# Patient Record
Sex: MALE | Race: WHITE | NOT HISPANIC OR LATINO
[De-identification: names, ages, dates, MRNs, and addresses within clinical notes are randomized per-mention and may not be internally consistent; named-entity substitution may affect disease eponyms.]

---

## 2017-01-23 ENCOUNTER — APPOINTMENT (OUTPATIENT)
Dept: ORTHOPEDIC SURGERY | Facility: CLINIC | Age: 72
End: 2017-01-23

## 2017-01-23 VITALS
HEIGHT: 67.5 IN | DIASTOLIC BLOOD PRESSURE: 66 MMHG | HEART RATE: 69 BPM | SYSTOLIC BLOOD PRESSURE: 104 MMHG | WEIGHT: 171 LBS | BODY MASS INDEX: 26.52 KG/M2

## 2017-01-23 DIAGNOSIS — M51.36 OTHER INTERVERTEBRAL DISC DEGENERATION, LUMBAR REGION: ICD-10-CM

## 2017-01-23 DIAGNOSIS — Z78.9 OTHER SPECIFIED HEALTH STATUS: ICD-10-CM

## 2017-01-23 DIAGNOSIS — Z87.891 PERSONAL HISTORY OF NICOTINE DEPENDENCE: ICD-10-CM

## 2017-01-23 DIAGNOSIS — Z86.79 PERSONAL HISTORY OF OTHER DISEASES OF THE CIRCULATORY SYSTEM: ICD-10-CM

## 2017-01-23 DIAGNOSIS — M70.62 TROCHANTERIC BURSITIS, LEFT HIP: ICD-10-CM

## 2017-01-23 DIAGNOSIS — E78.00 PURE HYPERCHOLESTEROLEMIA, UNSPECIFIED: ICD-10-CM

## 2017-01-23 RX ORDER — GLUCOSAMINE/MSM/CHONDROIT SULF 500-166.6
TABLET ORAL
Refills: 0 | Status: ACTIVE | COMMUNITY

## 2017-01-23 RX ORDER — SIMVASTATIN 80 MG/1
TABLET, FILM COATED ORAL
Refills: 0 | Status: ACTIVE | COMMUNITY

## 2017-01-23 RX ORDER — VALSARTAN 40 MG/1
TABLET, COATED ORAL
Refills: 0 | Status: ACTIVE | COMMUNITY

## 2017-09-11 ENCOUNTER — APPOINTMENT (OUTPATIENT)
Dept: SURGICAL ONCOLOGY | Facility: CLINIC | Age: 72
End: 2017-09-11
Payer: MEDICARE

## 2017-09-11 VITALS
RESPIRATION RATE: 16 BRPM | HEART RATE: 79 BPM | WEIGHT: 178 LBS | OXYGEN SATURATION: 96 % | DIASTOLIC BLOOD PRESSURE: 85 MMHG | TEMPERATURE: 98.3 F | HEIGHT: 67.5 IN | SYSTOLIC BLOOD PRESSURE: 135 MMHG | BODY MASS INDEX: 27.61 KG/M2

## 2017-09-11 DIAGNOSIS — H52.209 UNSPECIFIED ASTIGMATISM, UNSPECIFIED EYE: ICD-10-CM

## 2017-09-11 DIAGNOSIS — M17.10 UNILATERAL PRIMARY OSTEOARTHRITIS, UNSPECIFIED KNEE: ICD-10-CM

## 2017-09-11 DIAGNOSIS — Z87.442 PERSONAL HISTORY OF URINARY CALCULI: ICD-10-CM

## 2017-09-11 DIAGNOSIS — Z80.3 FAMILY HISTORY OF MALIGNANT NEOPLASM OF BREAST: ICD-10-CM

## 2017-09-11 DIAGNOSIS — Z86.69 PERSONAL HISTORY OF OTHER DISEASES OF THE NERVOUS SYSTEM AND SENSE ORGANS: ICD-10-CM

## 2017-09-11 PROCEDURE — 99203 OFFICE O/P NEW LOW 30 MIN: CPT

## 2017-09-14 ENCOUNTER — APPOINTMENT (OUTPATIENT)
Dept: OPHTHALMOLOGY | Facility: CLINIC | Age: 72
End: 2017-09-14
Payer: MEDICARE

## 2017-09-14 DIAGNOSIS — H52.10 MYOPIA, UNSPECIFIED EYE: ICD-10-CM

## 2017-09-14 DIAGNOSIS — H25.093 OTHER AGE-RELATED INCIPIENT CATARACT, BILATERAL: ICD-10-CM

## 2017-09-14 DIAGNOSIS — H31.001 UNSPECIFIED CHORIORETINAL SCARS, RIGHT EYE: ICD-10-CM

## 2017-09-14 DIAGNOSIS — H01.139 ECZEMATOUS DERMATITIS OF UNSPECIFIED EYE, UNSPECIFIED EYELID: ICD-10-CM

## 2017-09-14 PROCEDURE — 92225: CPT | Mod: 50

## 2017-09-14 PROCEDURE — 92004 COMPRE OPH EXAM NEW PT 1/>: CPT

## 2017-09-14 RX ORDER — TRAZODONE HYDROCHLORIDE 300 MG/1
TABLET ORAL
Refills: 0 | Status: DISCONTINUED | COMMUNITY
End: 2017-09-14

## 2017-09-14 RX ORDER — ALPRAZOLAM 2 MG/1
TABLET ORAL
Refills: 0 | Status: DISCONTINUED | COMMUNITY
End: 2017-09-14

## 2017-09-18 ENCOUNTER — OUTPATIENT (OUTPATIENT)
Dept: OUTPATIENT SERVICES | Facility: HOSPITAL | Age: 72
LOS: 1 days | End: 2017-09-18
Payer: SELF-PAY

## 2017-09-18 DIAGNOSIS — L98.9 DISORDER OF THE SKIN AND SUBCUTANEOUS TISSUE, UNSPECIFIED: ICD-10-CM

## 2017-09-19 ENCOUNTER — RESULT REVIEW (OUTPATIENT)
Age: 72
End: 2017-09-19

## 2017-09-19 PROCEDURE — 88321 CONSLTJ&REPRT SLD PREP ELSWR: CPT

## 2017-09-20 LAB — SURGICAL PATHOLOGY STUDY: SIGNIFICANT CHANGE UP

## 2018-03-29 ENCOUNTER — APPOINTMENT (OUTPATIENT)
Dept: SURGICAL ONCOLOGY | Facility: CLINIC | Age: 73
End: 2018-03-29
Payer: MEDICARE

## 2018-03-29 VITALS
WEIGHT: 182 LBS | SYSTOLIC BLOOD PRESSURE: 120 MMHG | DIASTOLIC BLOOD PRESSURE: 79 MMHG | HEIGHT: 68 IN | HEART RATE: 73 BPM | OXYGEN SATURATION: 96 % | BODY MASS INDEX: 27.58 KG/M2

## 2018-03-29 DIAGNOSIS — F17.200 NICOTINE DEPENDENCE, UNSPECIFIED, UNCOMPLICATED: ICD-10-CM

## 2018-03-29 PROCEDURE — 99213 OFFICE O/P EST LOW 20 MIN: CPT

## 2018-03-29 RX ORDER — SENNOSIDES 8.6 MG
TABLET ORAL
Refills: 0 | Status: ACTIVE | COMMUNITY

## 2018-03-29 RX ORDER — PSYLLIUM HUSK 0.4 G
CAPSULE ORAL
Refills: 0 | Status: ACTIVE | COMMUNITY

## 2018-03-29 RX ORDER — MULTIVITAMIN
TABLET ORAL
Refills: 0 | Status: ACTIVE | COMMUNITY

## 2018-03-29 RX ORDER — MIRTAZAPINE 7.5 MG/1
TABLET, FILM COATED ORAL
Refills: 0 | Status: ACTIVE | COMMUNITY

## 2018-03-29 RX ORDER — ASPIRIN 81 MG
81 TABLET, DELAYED RELEASE (ENTERIC COATED) ORAL
Refills: 0 | Status: ACTIVE | COMMUNITY

## 2018-03-29 RX ORDER — MIRTAZAPINE 15 MG/1
15 TABLET, FILM COATED ORAL
Refills: 0 | Status: ACTIVE | COMMUNITY

## 2018-03-29 RX ORDER — CHROMIUM 200 MCG
TABLET ORAL
Refills: 0 | Status: ACTIVE | COMMUNITY

## 2018-11-19 ENCOUNTER — APPOINTMENT (OUTPATIENT)
Dept: SURGICAL ONCOLOGY | Facility: CLINIC | Age: 73
End: 2018-11-19
Payer: MEDICARE

## 2018-11-19 VITALS
WEIGHT: 178 LBS | SYSTOLIC BLOOD PRESSURE: 119 MMHG | HEIGHT: 68 IN | DIASTOLIC BLOOD PRESSURE: 77 MMHG | BODY MASS INDEX: 26.98 KG/M2 | OXYGEN SATURATION: 96 % | HEART RATE: 78 BPM

## 2018-11-19 PROCEDURE — 99213 OFFICE O/P EST LOW 20 MIN: CPT

## 2019-11-25 ENCOUNTER — APPOINTMENT (OUTPATIENT)
Dept: SURGICAL ONCOLOGY | Facility: CLINIC | Age: 74
End: 2019-11-25
Payer: MEDICARE

## 2019-11-25 VITALS
BODY MASS INDEX: 27.43 KG/M2 | DIASTOLIC BLOOD PRESSURE: 80 MMHG | WEIGHT: 181 LBS | HEIGHT: 68 IN | SYSTOLIC BLOOD PRESSURE: 129 MMHG | HEART RATE: 74 BPM | RESPIRATION RATE: 14 BRPM

## 2019-11-25 PROCEDURE — 99214 OFFICE O/P EST MOD 30 MIN: CPT

## 2019-12-11 NOTE — ASSESSMENT
[FreeTextEntry1] : September 2017 chest x-ray that [facility] was unremarkable.\par \par Due to his concern about a possible abnormality palpable anterior to the skin graft, I have given him a prescription for a targeted ultrasound of the region, to be performed at .\par \par Clinically doing well.\par \par We will see him in another year, sooner if needed\par \par \par 12-11-19.\par We spoke.\par November 30, 2019, he had a targeted ultrasound of the left shin @[facility], the area of his melanoma excision, which identified NO suspicious findings.\par He is presently asymptomatic, within normal self-examination.\par My physical examination had been normal.\par No further intervention is presently warranted.\par He will continue annual followup, sooner if needed

## 2019-12-11 NOTE — REASON FOR VISIT
[Follow-Up Visit] : a follow-up visit for [Other: _____] : [unfilled] [FreeTextEntry2] : left shin melanoma in situ

## 2019-12-11 NOTE — PHYSICAL EXAM
[Normal] : supple, no neck mass and thyroid not enlarged [Normal Neck Lymph Nodes] : normal neck lymph nodes  [Normal Groin Lymph Nodes] : normal groin lymph nodes [Normal Supraclavicular Lymph Nodes] : normal supraclavicular lymph nodes [Normal] : full range of motion and no deformities appreciated [Normal Axillary Lymph Nodes] : normal axillary lymph nodes [de-identified] : Below

## 2019-12-11 NOTE — REVIEW OF SYSTEMS
[Negative] : Heme/Lymph [FreeTextEntry4] : Diminished hearing [FreeTextEntry5] : Hypertension [FreeTextEntry6] : CAITY [de-identified] : Melanoma

## 2019-12-11 NOTE — HISTORY OF PRESENT ILLNESS
[de-identified] : 74 year-old man who had w.e. 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 by his dermatologist (Dr Eben LUZ) in 2017.\par \par An internal review of the pathology was concordant, melanoma in situ excised with negative margins\par \par This was an asymptomatic pigmented lesion on dermatologic surveillance.\par \par Today, he reports feeling something "unusual" in the area around the resection on the lower inner left leg for the past few weeks.\par No trauma.\par Asymptomatic.\par No other specific or constitutional signs or symptoms presently\par \par \par No previous personal history of melanoma.\par +Mohs procedure for a non-melanoma skin cancer of the left cheek, in the past.\par \par \par +FH:\par His mother had melanoma\par \par The only other relative with cancer is a maternal aunt who  of breast cancer at age 59.\par \par His dermatologist is Dr. 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 Davie OBANDO\par \par 2019 he had two "TIAs" manifest by right hemiparesis and a left-sided headache.\par No residual deficit.\par Extensive neurology evaluation by Dr. Veronica LINDO was unremarkable.\par \par Cardiology evaluation by Dr. Dewayne MCGINNIS revealed no abnormalities.\par \par Seen by an arrhythmia specialist, Dr. Dewayne REGAN; no dysrhythmia detected.\par He now has a LOOP-RECORDER.\par \par DAILY PLAVIX\par \par He does not have a pacemaker or defibrillator.\par He does not take any anticoagulants.\par \par Current medications are losartan for hypertension.\par He is on simvastatin for hypercholesterolemia.\par \par \par He has obstructive sleep apnea (CAITY) and his pulmonologist is Dr. Jeronimo WHITMAN at [place]. \par \par He had an eye examination  2017 with Dr Harris\par I suggested a followup visit present\par \par Colonoscopy in  with Dr. Davie Obando was unremarkable\par \par He has been seeing Dr. Philip Perlman (ENT) for diminished hearing\par \par Fall 2018 he had right knee injections by his orthopedist Dr. Hayden Talley

## 2020-05-28 ENCOUNTER — APPOINTMENT (OUTPATIENT)
Dept: ORTHOPEDIC SURGERY | Facility: CLINIC | Age: 75
End: 2020-05-28
Payer: MEDICARE

## 2020-05-28 PROCEDURE — 99214 OFFICE O/P EST MOD 30 MIN: CPT | Mod: 95

## 2020-05-28 RX ORDER — METHYLPREDNISOLONE 4 MG/1
4 TABLET ORAL
Qty: 1 | Refills: 2 | Status: ACTIVE | COMMUNITY
Start: 2020-05-28 | End: 1900-01-01

## 2020-05-28 NOTE — DISCUSSION/SUMMARY
[de-identified] : right thigh pain\par ? meralgia paresthetica\par discussed all options\par on Plavix\par had TIAs\par not diabetic\par Medrol\par will discuss with PCP to take Medrol\par Will F/U 2 weeks.\par All questions were answered, all alternatives discussed and the patient is in complete agreement with that plan. Follow-up appointment as instructed. Any issues and the patient will call or come in sooner. \par \par

## 2020-05-28 NOTE — HISTORY OF PRESENT ILLNESS
[Stable] : stable [de-identified] : Seen by me in 2017.\par Seen in past for left torch bursiitis.\par Now 2 months neurologist-meralgia paresthetica.\par Placed on gabapentin-not much help. Now on Nortryptiline-helping.\par Pain when sleeping.\par Numbness.\par Plays golf 2 times a week. \par Right thigh only.\par MRI lumbar done-? disc bulge as per neurologist.\par Had TIAs.\par No fever chills sweats nausea vomiting no bowel or bladder dysfunction, no recent weight loss or gain no night pain. This history is in addition to the intake form that I personally reviewed.

## 2020-05-28 NOTE — PHYSICAL EXAM
[de-identified] : adequate lower extremity motor strength, sensation is intact and symmetrical full range of motion flexion extension and rotation, no palpatory tenderness full range of motion of hips knees shoulders and elbows (all four extremities), no atrophy, negative straight leg raise, no swelling, normal ambulation, no apparent distress skin is intact, no upper or lower extremity instability, alert and oriented x3 and normal mood. Ht 5' 7" 171 lbs. TIAs.

## 2020-06-11 ENCOUNTER — APPOINTMENT (OUTPATIENT)
Dept: ORTHOPEDIC SURGERY | Facility: CLINIC | Age: 75
End: 2020-06-11
Payer: MEDICARE

## 2020-06-11 DIAGNOSIS — M54.16 RADICULOPATHY, LUMBAR REGION: ICD-10-CM

## 2020-06-11 PROCEDURE — 99213 OFFICE O/P EST LOW 20 MIN: CPT | Mod: 95

## 2020-06-11 NOTE — DISCUSSION/SUMMARY
[de-identified] : improved meralgia paresthetica\par getting better\par some numbness\par Medrol PRN\par F/U 3 weeks telehealth\par sees neurologist-nortriptyline\par All questions were answered, all alternatives discussed and the patient is in complete agreement with that plan. Follow-up appointment as instructed. Any issues and the patient will call or come in sooner. \par

## 2020-06-11 NOTE — PHYSICAL EXAM
[Normal] : Gait: normal [de-identified] : adequate lower extremity motor strength, sensation is intact and symmetrical full range of motion flexion extension and rotation, no palpatory tenderness full range of motion of hips knees shoulders and elbows (all four extremities), no atrophy, negative straight leg raise, no swelling, normal ambulation, no apparent distress skin is intact, no upper or lower extremity instability, alert and oriented x3 and normal mood.

## 2020-06-11 NOTE — HISTORY OF PRESENT ILLNESS
[Home] : at home, [unfilled] , at the time of the visit. [Other Location: e.g. Home (Enter Location, City,State)___] : at [unfilled] [Verbal consent obtained from patient] : the patient, [unfilled] [Improving] : improving [de-identified] : Called to discuss meralgia paresthetica.\par Much better.\par Medrol X 2.\par pain 1-2/10\par playing golf 9 holes and walking\par No fever chills sweats nausea vomiting no bowel or bladder dysfunction, no recent weight loss or gain no night pain. This history is in addition to the intake form that I personally reviewed.

## 2020-07-02 ENCOUNTER — APPOINTMENT (OUTPATIENT)
Dept: ORTHOPEDIC SURGERY | Facility: CLINIC | Age: 75
End: 2020-07-02

## 2020-12-07 ENCOUNTER — APPOINTMENT (OUTPATIENT)
Dept: SURGICAL ONCOLOGY | Facility: CLINIC | Age: 75
End: 2020-12-07
Payer: MEDICARE

## 2020-12-07 VITALS
BODY MASS INDEX: 25.61 KG/M2 | DIASTOLIC BLOOD PRESSURE: 76 MMHG | WEIGHT: 169 LBS | HEIGHT: 68 IN | OXYGEN SATURATION: 97 % | HEART RATE: 76 BPM | SYSTOLIC BLOOD PRESSURE: 114 MMHG

## 2020-12-07 PROCEDURE — 99214 OFFICE O/P EST MOD 30 MIN: CPT

## 2020-12-07 NOTE — REVIEW OF SYSTEMS
[Negative] : Heme/Lymph [FreeTextEntry5] : Hypertension [de-identified] : Arthritis [de-identified] : Melanoma

## 2020-12-07 NOTE — PHYSICAL EXAM
[Normal] : supple, no neck mass and thyroid not enlarged [Normal Neck Lymph Nodes] : normal neck lymph nodes  [Normal Supraclavicular Lymph Nodes] : normal supraclavicular lymph nodes [Normal Groin Lymph Nodes] : normal groin lymph nodes [Normal Axillary Lymph Nodes] : normal axillary lymph nodes [Normal] : full range of motion and no deformities appreciated [de-identified] : Below

## 2020-12-07 NOTE — HISTORY OF PRESENT ILLNESS
[de-identified] : 75 year-old man who had w.e. 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 by his dermatologist (Dr Eben LUZ) in 2017.\par \par An internal review of the pathology was concordant, melanoma in situ excised with negative margins\par \par This was an asymptomatic pigmented lesion on dermatologic surveillance.\par \par 2019: He felt something "unusual" in the area around the resection on the lower inner left leg for the past few weeks.\par No trauma.\par Asymptomatic.\par No other specific or constitutional signs or symptoms at the time.\par My examination was unremarkable.\par \par 2019:\par Targeted Left Shin Sonogram at : No suspicious findings.\par \par He presents for his annual follow-up with me today.\par \par No previous personal history of melanoma.\par +Mohs procedure for a non-melanoma skin cancer of the left cheek, in the past.\par \par +FH:\par His mother had melanoma\par \par The only other relative with cancer is a maternal aunt who  of breast cancer at age 59.\par \par \par His dermatologist is Dr. 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 Davie OBANDO\par \par 2019 he had two "TIAs" manifest by right hemiparesis and a left-sided headache.\par No residual deficit.\par Extensive neurology evaluation by Dr. Veronica LINDO was unremarkable.\par \par Cardiology evaluation by Dr. Dewayne MCGINNIS revealed no abnormalities.\par \par Seen by an arrhythmia specialist, Dr. Dewayne REGAN; no dysrhythmia detected.\par He now has a LOOP-RECORDER.\par \par DAILY Plavix changed to ELIQUIS in the spring 2020, when he was diagnosed with atrial fibrillation.\par Remainder of his evaluation was unremarkable.\par \par He does not have a pacemaker or defibrillator.\par \par Current medications are losartan for hypertension.\par Simvastatin for hypercholesterolemia.\par \par +Obstructive sleep apnea (CAITY)\par Pulmonary: Dr. Jeronimo WHITMAN at [place]. \par \par \par He had an eye examination  2017 with Dr Harris\par I suggested a followup visit, again.\par 2019 visit to an optometrist at [facility] in [place] was reportedly normal\par \par \par Colonoscopy in  with Dr. Davie Obando was unremarkable\par \par \par He has been seeing Dr. Philip Perlman (ENT) for diminished hearing\par \par 2018 he had right knee injections by his orthopedist Dr. Hayden Talley

## 2020-12-07 NOTE — ASSESSMENT
[FreeTextEntry1] : Clinically doing well.\par \par September 2017:\par Chest x-ray was normal.\par Presently, no other diagnostic tests are warranted.\par \par I have asked to see him in another year, sooner if needed

## 2020-12-07 NOTE — REASON FOR VISIT
[Follow-Up Visit] : a follow-up visit for [Other: _____] : [unfilled] [FreeTextEntry2] : Stage 0 left shin melanoma

## 2021-12-09 ENCOUNTER — NON-APPOINTMENT (OUTPATIENT)
Age: 76
End: 2021-12-09

## 2021-12-13 ENCOUNTER — APPOINTMENT (OUTPATIENT)
Dept: SURGICAL ONCOLOGY | Facility: CLINIC | Age: 76
End: 2021-12-13
Payer: MEDICARE

## 2021-12-13 VITALS
BODY MASS INDEX: 27.3 KG/M2 | TEMPERATURE: 98.1 F | OXYGEN SATURATION: 96 % | SYSTOLIC BLOOD PRESSURE: 134 MMHG | RESPIRATION RATE: 16 BRPM | HEIGHT: 67.5 IN | DIASTOLIC BLOOD PRESSURE: 78 MMHG | WEIGHT: 176 LBS | HEART RATE: 80 BPM

## 2021-12-13 PROCEDURE — 99214 OFFICE O/P EST MOD 30 MIN: CPT

## 2021-12-13 NOTE — REVIEW OF SYSTEMS
[Negative] : Heme/Lymph [FreeTextEntry5] : Atrial fibrillation [FreeTextEntry6] : CAITY [de-identified] : Melanoma [de-identified] : TIAs

## 2021-12-13 NOTE — HISTORY OF PRESENT ILLNESS
[de-identified] : 76 year-old man who had w.e. 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 by his dermatologist (Dr Eben LUZ) in 2017.\par \par An internal review of the pathology was concordant, melanoma in situ excised with negative margins\par \par This was an asymptomatic pigmented lesion on dermatologic surveillance.\par \par 2019: He felt something "unusual" in the area around the resection on the lower inner left leg for the past few weeks.\par No trauma.\par Asymptomatic.\par No other specific or constitutional signs or symptoms at the time.\par My examination was unremarkable.\par \par 2019:\par Targeted Left Shin Sonogram at [facility]: No suspicious findings.\par \par He presents for his annual follow-up with me today.\par \par No previous personal history of melanoma.\par + Several Mohs procedures for nonmelanoma skin cancers.\par He is also had a series of cryotherapy for similar problems.\par \par +FH:\par His mother had melanoma\par \par The only other relative with cancer is a maternal aunt who  of breast cancer at age 59.\par \par \par His dermatologist is Dr. 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 Davie OBANDO\par \par 2019 he had two "TIAs" manifest by right hemiparesis and a left-sided headache.\par No residual deficit.\par Extensive neurology evaluation by Dr. Veronica LINDO was unremarkable.\par \par Cardiology evaluation by Dr. Dewayne MCGINNIS revealed no abnormalities.\par \par Seen by an arrhythmia specialist, Dr. Dewayne REGAN; no dysrhythmia detected.\par He now has a LOOP-RECORDER.\par \par DAILY Plavix changed to ELIQUIS in the spring 2020, when he was diagnosed with atrial fibrillation.\par Remainder of his evaluation was unremarkable.\par \par He does not have a pacemaker or defibrillator.\par \par Current medications are losartan for hypertension.\par Simvastatin for hypercholesterolemia.\par \par +Obstructive sleep apnea (CAITY)\par Pulmonary: Dr. Jeronimo WHITMAN at [place].\par \par 2021:\par Bilateral laparoscopic inguinal hernia repair at [facility] by Dr. COVARRUBIAS\par \par \par He had an eye examination  2017 with Dr Harris\par 2019 visit to an optometrist at [facility] in [place] was normal.\par I suggested a follow-up visit.\par \par \par Colonoscopy in  with Dr. Davie Obando was unremarkable.\par When repeated in 2020, had to be terminated prematurely due to bradycardia.\par A subsequent virtual colonoscopy at [facility] was normal.\par \par \par He has been seeing Dr. Philip Perlman (ENT) for diminished hearing\par \par 2018 he had right knee injections by his orthopedist Dr. Hayden Talley

## 2021-12-13 NOTE — PHYSICAL EXAM
[Normal] : supple, no neck mass and thyroid not enlarged [Normal Neck Lymph Nodes] : normal neck lymph nodes  [Normal Supraclavicular Lymph Nodes] : normal supraclavicular lymph nodes [Normal Groin Lymph Nodes] : normal groin lymph nodes [Normal Axillary Lymph Nodes] : normal axillary lymph nodes [Normal] : full range of motion and no deformities appreciated [de-identified] : Below

## 2021-12-13 NOTE — REASON FOR VISIT
[Follow-Up Visit] : a follow-up visit for [Other: _____] : [unfilled] [FreeTextEntry2] : Left shin, melanoma in situ

## 2022-12-12 ENCOUNTER — APPOINTMENT (OUTPATIENT)
Dept: SURGICAL ONCOLOGY | Facility: CLINIC | Age: 77
End: 2022-12-12

## 2022-12-12 VITALS
WEIGHT: 176 LBS | DIASTOLIC BLOOD PRESSURE: 77 MMHG | SYSTOLIC BLOOD PRESSURE: 115 MMHG | HEART RATE: 86 BPM | TEMPERATURE: 97.7 F | OXYGEN SATURATION: 96 % | HEIGHT: 67.5 IN | RESPIRATION RATE: 16 BRPM | BODY MASS INDEX: 27.3 KG/M2

## 2022-12-12 PROCEDURE — 99214 OFFICE O/P EST MOD 30 MIN: CPT

## 2022-12-12 RX ORDER — LOSARTAN POTASSIUM AND HYDROCHLOROTHIAZIDE 12.5; 5 MG/1; MG/1
50-12.5 TABLET ORAL
Qty: 90 | Refills: 0 | Status: ACTIVE | COMMUNITY
Start: 2022-10-11

## 2022-12-12 RX ORDER — TAMSULOSIN HYDROCHLORIDE 0.4 MG/1
0.4 CAPSULE ORAL
Qty: 180 | Refills: 0 | Status: ACTIVE | COMMUNITY
Start: 2022-09-21

## 2022-12-12 RX ORDER — APIXABAN 5 MG/1
5 TABLET, FILM COATED ORAL
Qty: 180 | Refills: 0 | Status: ACTIVE | COMMUNITY
Start: 2022-10-11

## 2022-12-12 NOTE — HISTORY OF PRESENT ILLNESS
[de-identified] : 77 year-old man.\par \par 2017:\par Wide excision 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 by his dermatologist (Dr Eben LUZ) in 2017.\par \par An internal review of the pathology was concordant, melanoma in situ excised with negative margins\par \par This was an asymptomatic pigmented lesion on dermatologic surveillance.\par \par 2019: He felt something "unusual" in the area around the resection on the lower inner left leg for the past few weeks.\par No trauma.\par Asymptomatic.\par No other specific or constitutional signs or symptoms at the time.\par My examination was unremarkable.\par \par 2019:\par Targeted Left Shin Sonogram at : No suspicious findings.\par \par He presents for his annual follow-up with me today.\par \par No previous personal history of melanoma.\par + Several Mohs procedures for nonmelanoma skin cancers.\par He is also had a series of cryotherapy for similar problems.\par \par +FH:\par His mother had melanoma\par \par The only other relative with cancer is a maternal aunt who  of breast cancer at age 59.\par \par \par His dermatologist is Dr. 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 Davie OBANDO\par \par 2019 he had two "TIAs" manifest by right hemiparesis and a left-sided headache.\par No residual deficit.\par Extensive neurology evaluation by Dr. Veronica LINDO was unremarkable.\par \par Cardiology evaluation by Dr. Dewayne MCGINNIS revealed no abnormalities.\par \par Seen by an arrhythmia specialist, Dr. Dewayne REGAN; no dysrhythmia detected.\par + LOOP-RECORDER, he may have it removed electively in .\par \par DAILY Plavix changed to ELIQUIS in the spring 2020, when he was diagnosed with atrial fibrillation.\par Remainder of his evaluation was unremarkable.\par \par He does not have a pacemaker or defibrillator.\par \par Current medications are losartan for hypertension.\par Simvastatin for hypercholesterolemia.\par \par +Obstructive sleep apnea (CAITY)\par Pulmonary: Dr. Jeronimo WHITMAN at [place].\par \par 2021:\par Bilateral laparoscopic inguinal hernia repair at [facility] by Dr. COVARRUBIAS\par \par \par He had an eye examination  2017 with Dr Harris\par 2019 visit to an optometrist at [facility] in [place] was normal.\par I suggested a follow-up visit, again today.\par \par \par Colonoscopy in  with Dr. Davie Obando was unremarkable.\par When repeated in 2020, had to be terminated prematurely due to bradycardia.\par A subsequent virtual colonoscopy at [facility] was normal.\par \par \par He has been seeing Dr. Philip Perlman (ENT) for diminished hearing\par \par 2018 he had right knee injections by his orthopedist Dr. Hayden Talley

## 2022-12-12 NOTE — REVIEW OF SYSTEMS
[Negative] : Heme/Lymph [FreeTextEntry5] : Loop recorder [FreeTextEntry6] : CAITY [de-identified] : Skin cancer [FreeTextEntry8] : History of bilateral laparoscopic inguinal hernia repairs [de-identified] : TIA

## 2022-12-12 NOTE — PHYSICAL EXAM
[Normal] : supple, no neck mass and thyroid not enlarged [Normal Neck Lymph Nodes] : normal neck lymph nodes  [Normal Supraclavicular Lymph Nodes] : normal supraclavicular lymph nodes [Normal Groin Lymph Nodes] : normal groin lymph nodes [Normal Axillary Lymph Nodes] : normal axillary lymph nodes [Normal] : full range of motion and no deformities appreciated [de-identified] : Below

## 2023-07-09 ENCOUNTER — EMERGENCY (EMERGENCY)
Facility: HOSPITAL | Age: 78
LOS: 1 days | Discharge: HOME | End: 2023-07-09
Attending: EMERGENCY MEDICINE | Admitting: EMERGENCY MEDICINE
Payer: MEDICARE

## 2023-07-09 VITALS
DIASTOLIC BLOOD PRESSURE: 70 MMHG | OXYGEN SATURATION: 98 % | TEMPERATURE: 99 F | HEART RATE: 70 BPM | RESPIRATION RATE: 17 BRPM | SYSTOLIC BLOOD PRESSURE: 137 MMHG

## 2023-07-09 VITALS
DIASTOLIC BLOOD PRESSURE: 75 MMHG | HEART RATE: 69 BPM | SYSTOLIC BLOOD PRESSURE: 141 MMHG | RESPIRATION RATE: 18 BRPM | OXYGEN SATURATION: 98 % | TEMPERATURE: 98 F | WEIGHT: 175.05 LBS | HEIGHT: 68 IN

## 2023-07-09 LAB
ALBUMIN SERPL ELPH-MCNC: 4.2 G/DL — SIGNIFICANT CHANGE UP (ref 3.3–5)
ALP SERPL-CCNC: 49 U/L — SIGNIFICANT CHANGE UP (ref 40–120)
ALT FLD-CCNC: 26 U/L — SIGNIFICANT CHANGE UP (ref 12–78)
ANION GAP SERPL CALC-SCNC: 3 MMOL/L — LOW (ref 5–17)
APPEARANCE UR: CLEAR — SIGNIFICANT CHANGE UP
APTT BLD: 42.4 SEC — HIGH (ref 27.5–35.5)
AST SERPL-CCNC: 17 U/L — SIGNIFICANT CHANGE UP (ref 15–37)
BASOPHILS # BLD AUTO: 0.05 K/UL — SIGNIFICANT CHANGE UP (ref 0–0.2)
BASOPHILS NFR BLD AUTO: 0.6 % — SIGNIFICANT CHANGE UP (ref 0–2)
BILIRUB SERPL-MCNC: 0.4 MG/DL — SIGNIFICANT CHANGE UP (ref 0.2–1.2)
BILIRUB UR-MCNC: NEGATIVE — SIGNIFICANT CHANGE UP
BUN SERPL-MCNC: 19 MG/DL — SIGNIFICANT CHANGE UP (ref 7–23)
CALCIUM SERPL-MCNC: 9.7 MG/DL — SIGNIFICANT CHANGE UP (ref 8.5–10.1)
CHLORIDE SERPL-SCNC: 106 MMOL/L — SIGNIFICANT CHANGE UP (ref 96–108)
CO2 SERPL-SCNC: 28 MMOL/L — SIGNIFICANT CHANGE UP (ref 22–31)
COLOR SPEC: ABNORMAL
CREAT SERPL-MCNC: 1.2 MG/DL — SIGNIFICANT CHANGE UP (ref 0.5–1.3)
DIFF PNL FLD: ABNORMAL
EGFR: 63 ML/MIN/1.73M2 — SIGNIFICANT CHANGE UP
EOSINOPHIL # BLD AUTO: 0.07 K/UL — SIGNIFICANT CHANGE UP (ref 0–0.5)
EOSINOPHIL NFR BLD AUTO: 0.8 % — SIGNIFICANT CHANGE UP (ref 0–6)
GLUCOSE SERPL-MCNC: 118 MG/DL — HIGH (ref 70–99)
GLUCOSE UR QL: NEGATIVE MG/DL — SIGNIFICANT CHANGE UP
HCT VFR BLD CALC: 41.7 % — SIGNIFICANT CHANGE UP (ref 39–50)
HGB BLD-MCNC: 13.9 G/DL — SIGNIFICANT CHANGE UP (ref 13–17)
IMM GRANULOCYTES NFR BLD AUTO: 0.3 % — SIGNIFICANT CHANGE UP (ref 0–0.9)
INR BLD: 1.37 RATIO — HIGH (ref 0.88–1.16)
KETONES UR-MCNC: NEGATIVE MG/DL — SIGNIFICANT CHANGE UP
LEUKOCYTE ESTERASE UR-ACNC: ABNORMAL
LYMPHOCYTES # BLD AUTO: 1.49 K/UL — SIGNIFICANT CHANGE UP (ref 1–3.3)
LYMPHOCYTES # BLD AUTO: 16.9 % — SIGNIFICANT CHANGE UP (ref 13–44)
MCHC RBC-ENTMCNC: 29.4 PG — SIGNIFICANT CHANGE UP (ref 27–34)
MCHC RBC-ENTMCNC: 33.3 GM/DL — SIGNIFICANT CHANGE UP (ref 32–36)
MCV RBC AUTO: 88.2 FL — SIGNIFICANT CHANGE UP (ref 80–100)
MONOCYTES # BLD AUTO: 0.43 K/UL — SIGNIFICANT CHANGE UP (ref 0–0.9)
MONOCYTES NFR BLD AUTO: 4.9 % — SIGNIFICANT CHANGE UP (ref 2–14)
NEUTROPHILS # BLD AUTO: 6.73 K/UL — SIGNIFICANT CHANGE UP (ref 1.8–7.4)
NEUTROPHILS NFR BLD AUTO: 76.5 % — SIGNIFICANT CHANGE UP (ref 43–77)
NITRITE UR-MCNC: NEGATIVE — SIGNIFICANT CHANGE UP
NRBC # BLD: 0 /100 WBCS — SIGNIFICANT CHANGE UP (ref 0–0)
PH UR: 5.5 — SIGNIFICANT CHANGE UP (ref 5–8)
PLATELET # BLD AUTO: 250 K/UL — SIGNIFICANT CHANGE UP (ref 150–400)
POTASSIUM SERPL-MCNC: 3.7 MMOL/L — SIGNIFICANT CHANGE UP (ref 3.5–5.3)
POTASSIUM SERPL-SCNC: 3.7 MMOL/L — SIGNIFICANT CHANGE UP (ref 3.5–5.3)
PROT SERPL-MCNC: 7.6 G/DL — SIGNIFICANT CHANGE UP (ref 6–8.3)
PROT UR-MCNC: 100 MG/DL
PROTHROM AB SERPL-ACNC: 16.1 SEC — HIGH (ref 10.5–13.4)
RBC # BLD: 4.73 M/UL — SIGNIFICANT CHANGE UP (ref 4.2–5.8)
RBC # FLD: 13 % — SIGNIFICANT CHANGE UP (ref 10.3–14.5)
SODIUM SERPL-SCNC: 137 MMOL/L — SIGNIFICANT CHANGE UP (ref 135–145)
SP GR SPEC: 1.01 — SIGNIFICANT CHANGE UP (ref 1–1.03)
UROBILINOGEN FLD QL: 0.2 MG/DL — SIGNIFICANT CHANGE UP (ref 0.2–1)
WBC # BLD: 8.8 K/UL — SIGNIFICANT CHANGE UP (ref 3.8–10.5)
WBC # FLD AUTO: 8.8 K/UL — SIGNIFICANT CHANGE UP (ref 3.8–10.5)

## 2023-07-09 PROCEDURE — 81001 URINALYSIS AUTO W/SCOPE: CPT

## 2023-07-09 PROCEDURE — 74176 CT ABD & PELVIS W/O CONTRAST: CPT | Mod: 26,MA

## 2023-07-09 PROCEDURE — 96365 THER/PROPH/DIAG IV INF INIT: CPT

## 2023-07-09 PROCEDURE — 85730 THROMBOPLASTIN TIME PARTIAL: CPT

## 2023-07-09 PROCEDURE — 74176 CT ABD & PELVIS W/O CONTRAST: CPT | Mod: MA

## 2023-07-09 PROCEDURE — 87086 URINE CULTURE/COLONY COUNT: CPT

## 2023-07-09 PROCEDURE — 80053 COMPREHEN METABOLIC PANEL: CPT

## 2023-07-09 PROCEDURE — 85025 COMPLETE CBC W/AUTO DIFF WBC: CPT

## 2023-07-09 PROCEDURE — 85610 PROTHROMBIN TIME: CPT

## 2023-07-09 PROCEDURE — 99285 EMERGENCY DEPT VISIT HI MDM: CPT | Mod: FS

## 2023-07-09 PROCEDURE — 99284 EMERGENCY DEPT VISIT MOD MDM: CPT | Mod: 25

## 2023-07-09 PROCEDURE — 36415 COLL VENOUS BLD VENIPUNCTURE: CPT

## 2023-07-09 PROCEDURE — 96361 HYDRATE IV INFUSION ADD-ON: CPT

## 2023-07-09 RX ORDER — CIPROFLOXACIN LACTATE 400MG/40ML
1 VIAL (ML) INTRAVENOUS
Qty: 14 | Refills: 0
Start: 2023-07-09 | End: 2023-07-15

## 2023-07-09 RX ORDER — SODIUM CHLORIDE 9 MG/ML
1000 INJECTION INTRAMUSCULAR; INTRAVENOUS; SUBCUTANEOUS ONCE
Refills: 0 | Status: COMPLETED | OUTPATIENT
Start: 2023-07-09 | End: 2023-07-09

## 2023-07-09 RX ORDER — CIPROFLOXACIN LACTATE 400MG/40ML
400 VIAL (ML) INTRAVENOUS ONCE
Refills: 0 | Status: COMPLETED | OUTPATIENT
Start: 2023-07-09 | End: 2023-07-09

## 2023-07-09 RX ADMIN — Medication 200 MILLIGRAM(S): at 14:10

## 2023-07-09 RX ADMIN — Medication 400 MILLIGRAM(S): at 15:18

## 2023-07-09 RX ADMIN — SODIUM CHLORIDE 1000 MILLILITER(S): 9 INJECTION INTRAMUSCULAR; INTRAVENOUS; SUBCUTANEOUS at 11:47

## 2023-07-09 RX ADMIN — SODIUM CHLORIDE 1000 MILLILITER(S): 9 INJECTION INTRAMUSCULAR; INTRAVENOUS; SUBCUTANEOUS at 14:20

## 2023-07-09 NOTE — ED PROVIDER NOTE - PATIENT PORTAL LINK FT
You can access the FollowMyHealth Patient Portal offered by [facility] by registering at the following website: [email or web address]. By joining [facility]’s FollowMyHealth portal, you will also be able to view your health information using other applications (apps) compatible with our system.

## 2023-07-09 NOTE — ED PROVIDER NOTE - NS ED ATTENDING STATEMENT MOD
This was a shared visit with the DARIANA. I reviewed and verified the documentation and independently performed the documented:

## 2023-07-09 NOTE — ED PROVIDER NOTE - CARE PROVIDER_API CALL
Marquis Newberry  Urology  [address], [address]  [place] [address]  Phone: [phone]  Fax: [phone]  Follow Up Time:

## 2023-07-09 NOTE — ED PROVIDER NOTE - CLINICAL SUMMARY MEDICAL DECISION MAKING FREE TEXT BOX
Patient is a 78-year-old white male who underwent outpatient greenlight laser treatment a few weeks ago initially had painless hematuria which resolved but then returned over the past day with hematuria with slight pain on urination.  No fever no chills no nausea no vomiting no diarrhea.  This case will require thorough evaluation as well as labs and imaging.

## 2023-07-09 NOTE — ED ADULT NURSE NOTE - CHIEF COMPLAINT QUOTE
Patient is a 77yo male complaining of hematuria since this morning Patient had a green light prostatectomy on 6/2/2023 Patient states he was clear of blood for 10 days but it started again today Patient complaining of pain on tip of penis patient denies dysuria

## 2023-07-09 NOTE — ED PROVIDER NOTE - ATTENDING APP SHARED VISIT CONTRIBUTION OF CARE
Exam reveals a well-developed well-nourished older white male in no acute distress with a soft benign nontender abdomen.  Extremities are free from any clubbing cyanosis or edema.  Skin was warm dry without any pale complexion.

## 2023-07-09 NOTE — ED ADULT NURSE NOTE - OBJECTIVE STATEMENT
Patient is 79yo M presents with c/o hematuria since yesterday. Patient reports he had green light procedure on 6/2/23, reports he had several weeks of hematuria after which he was told is normal, reports the hematuria stopped 10 days ago, reports concern when it started again yesterday after 10 days of clear urine. Patient reports pain to urethra when he urinates. Patient denies abdominal pain or back pain. Patient is 77yo M presents with c/o hematuria since yesterday. Patient reports he had green light procedure on 6/2/23, reports he had several weeks of hematuria after which he was told is normal, reports the hematuria stopped 10 days ago, reports concern when it started again yesterday after 10 days of clear urine. Patient reports pain to urethra when he urinates. Patient denies abdominal pain or back pain.  Bladder scan performed on patient per MD Yuen orders, patient reports he voided prior to arrival, 30ml urine in bladder per scan, MD Yuen made aware.

## 2023-07-09 NOTE — ED PROVIDER NOTE - NSFOLLOWUPINSTRUCTIONS_ED_ALL_ED_FT
Follow up with Urology  take antibiotics as directed  return to er for any worsening symptoms  rest and drink plenty of fluids     Urinary Tract Infection, Adult    A urinary tract infection (UTI) is an infection of any part of the urinary tract. The urinary tract includes:  The kidneys.  The ureters.  The bladder.  The urethra.  These organs make, store, and get rid of pee (urine) in the body.    What are the causes?  This infection is caused by germs (bacteria) in your genital area. These germs grow and cause swelling (inflammation) of your urinary tract.    What increases the risk?  The following factors may make you more likely to develop this condition:  Using a small, thin tube (catheter) to drain pee.  Not being able to control when you pee or poop (incontinence).  Being female. If you are female, these things can increase the risk:  Using these methods to prevent pregnancy:  A medicine that kills sperm (spermicide).  A device that blocks sperm (diaphragm).  Having low levels of a female hormone (estrogen).  Being pregnant.  You are more likely to develop this condition if:  You have genes that add to your risk.  You are sexually active.  You take antibiotic medicines.  You have trouble peeing because of:  A prostate that is bigger than normal, if you are male.  A blockage in the part of your body that drains pee from the bladder.  A kidney stone.  A nerve condition that affects your bladder.  Not getting enough to drink.  Not peeing often enough.  You have other conditions, such as:  Diabetes.  A weak disease-fighting system (immune system).  Sickle cell disease.  Gout.  Injury of the spine.  What are the signs or symptoms?  Symptoms of this condition include:  Needing to pee right away.  Peeing small amounts often.  Pain or burning when peeing.  Blood in the pee.  Pee that smells bad or not like normal.  Trouble peeing.  Pee that is cloudy.  Fluid coming from the vagina, if you are female.  Pain in the belly or lower back.  Other symptoms include:  Vomiting.  Not feeling hungry.  Feeling mixed up (confused). This may be the first symptom in older adults.  Being tired and grouchy (irritable).  A fever.  Watery poop (diarrhea).  How is this treated?  Taking antibiotic medicine.  Taking other medicines.  Drinking enough water.  In some cases, you may need to see a specialist.    Follow these instructions at home:    Medicines    Take over-the-counter and prescription medicines only as told by your doctor.  If you were prescribed an antibiotic medicine, take it as told by your doctor. Do not stop taking it even if you start to feel better.  General instructions    Make sure you:  Pee until your bladder is empty.  Do not hold pee for a long time.  Empty your bladder after sex.  Wipe from front to back after peeing or pooping if you are a female. Use each tissue one time when you wipe.  Drink enough fluid to keep your pee pale yellow.  Keep all follow-up visits.  Contact a doctor if:  You do not get better after 1–2 days.  Your symptoms go away and then come back.  Get help right away if:  You have very bad back pain.  You have very bad pain in your lower belly.  You have a fever.  You have chills.  You feeling like you will vomit or you vomit.  Summary  A urinary tract infection (UTI) is an infection of any part of the urinary tract.  This condition is caused by germs in your genital area.  There are many risk factors for a UTI.  Treatment includes antibiotic medicines.  Drink enough fluid to keep your pee pale yellow.  This information is not intended to replace advice given to you by your health care provider. Make sure you discuss any questions you have with your health care provider.

## 2023-07-09 NOTE — ED ADULT TRIAGE NOTE - CHIEF COMPLAINT QUOTE
Patient is a 79yo male complaining of hematuria since this morning Patient had a green light prostatectomy on 6/2/2023 Patient states he was clear of blood for 10 days but it started again today Patient complaining of pain on tip of penis patient denies dysuria

## 2023-07-09 NOTE — ED ADULT NURSE NOTE - NSFALLUNIVINTERV_ED_ALL_ED
Bed/Stretcher in lowest position, wheels locked, appropriate side rails in place/Call bell, personal items and telephone in reach/Instruct patient to call for assistance before getting out of bed/chair/stretcher/Non-slip footwear applied when patient is off stretcher/[place] to call system/Physically safe environment - no spills, clutter or unnecessary equipment/Purposeful proactive rounding/Room/bathroom lighting operational, light cord in reach

## 2023-07-09 NOTE — ED PROVIDER NOTE - OBJECTIVE STATEMENT
Patient is a 78-year-old male with past medical history of A-fib TIA on Xarelto BPH coming in for hematuria since yesterday.  Patient states he had greenlight procedure done on June 2 with Dr. Newberry.  Patient states he has been hematuria free for about 10 days and then started again yesterday.  Patient denies any back pain fever chills nausea vomiting abdominal pain.  Patient states he does feel little pain at the tip of his penis.  Patient denies any clots.  Patient went to urgent care with Dr. Barrett covering doctor advised to come to emergency room.

## 2023-07-10 LAB
CULTURE RESULTS: SIGNIFICANT CHANGE UP
SPECIMEN SOURCE: SIGNIFICANT CHANGE UP

## 2023-12-11 ENCOUNTER — APPOINTMENT (OUTPATIENT)
Dept: SURGICAL ONCOLOGY | Facility: CLINIC | Age: 78
End: 2023-12-11
Payer: MEDICARE

## 2023-12-11 VITALS
BODY MASS INDEX: 27.15 KG/M2 | HEIGHT: 67.5 IN | RESPIRATION RATE: 16 BRPM | SYSTOLIC BLOOD PRESSURE: 111 MMHG | DIASTOLIC BLOOD PRESSURE: 78 MMHG | WEIGHT: 175 LBS | HEART RATE: 67 BPM | OXYGEN SATURATION: 97 %

## 2023-12-11 DIAGNOSIS — D03.72 MELANOMA IN SITU OF LEFT LOWER LIMB, INCLUDING HIP: ICD-10-CM

## 2023-12-11 PROCEDURE — 99214 OFFICE O/P EST MOD 30 MIN: CPT

## 2024-12-16 ENCOUNTER — APPOINTMENT (OUTPATIENT)
Dept: SURGICAL ONCOLOGY | Facility: CLINIC | Age: 79
End: 2024-12-16
Payer: MEDICARE

## 2024-12-16 VITALS
HEIGHT: 67.5 IN | DIASTOLIC BLOOD PRESSURE: 73 MMHG | BODY MASS INDEX: 26.37 KG/M2 | WEIGHT: 170 LBS | OXYGEN SATURATION: 96 % | HEART RATE: 83 BPM | SYSTOLIC BLOOD PRESSURE: 123 MMHG

## 2024-12-16 DIAGNOSIS — D03.72 MELANOMA IN SITU OF LEFT LOWER LIMB, INCLUDING HIP: ICD-10-CM

## 2024-12-16 PROCEDURE — 99214 OFFICE O/P EST MOD 30 MIN: CPT

## 2025-02-08 NOTE — ED PROVIDER NOTE - CPE EDP SKIN NORM
Plas[place] in past. Continue to monitor closely. Continue to monitor and continue present management           normal...